# Patient Record
Sex: FEMALE | Race: WHITE
[De-identification: names, ages, dates, MRNs, and addresses within clinical notes are randomized per-mention and may not be internally consistent; named-entity substitution may affect disease eponyms.]

---

## 2019-07-04 ENCOUNTER — HOSPITAL ENCOUNTER (EMERGENCY)
Dept: HOSPITAL 25 - UCEAST | Age: 54
Discharge: HOME | End: 2019-07-04
Payer: COMMERCIAL

## 2019-07-04 DIAGNOSIS — Z87.440: ICD-10-CM

## 2019-07-04 DIAGNOSIS — N39.0: Primary | ICD-10-CM

## 2019-07-04 PROCEDURE — 87086 URINE CULTURE/COLONY COUNT: CPT

## 2019-07-04 PROCEDURE — G0463 HOSPITAL OUTPT CLINIC VISIT: HCPCS

## 2019-07-04 PROCEDURE — 99202 OFFICE O/P NEW SF 15 MIN: CPT

## 2019-07-04 PROCEDURE — 87077 CULTURE AEROBIC IDENTIFY: CPT

## 2019-07-04 PROCEDURE — 87186 SC STD MICRODIL/AGAR DIL: CPT

## 2019-07-04 PROCEDURE — 81003 URINALYSIS AUTO W/O SCOPE: CPT

## 2019-07-04 NOTE — UC
Complaint Female HPI





- HPI Summary


HPI Summary: 





Yesterday AM started w/ dysuria and then today noticed blood in urine. Has had 

UTI in the distant past.  denies fever, back pain





- History Of Current Complaint


Chief Complaint: UCGU


Stated Complaint: URINARY ISSUE


Time Seen by Provider: 07/04/19 11:15


Hx Obtained From: Patient


Pregnant?: No


Pain Intensity: 2


Pain Scale Used: 0-10 Numeric


Character: Burning


Aggravating Factor(s): Urination


Alleviating Factor(s): Nothing


Associated Signs And Symptoms: Negative: Fever, Back Pain, Vomiting(# Of 

Episodes =)





- Allergies/Home Medications


Allergies/Adverse Reactions: 


 Allergies











Allergy/AdvReac Type Severity Reaction Status Date / Time


 


No Known Allergies Allergy   Verified 07/04/19 10:55














PMH/Surg Hx/FS Hx/Imm Hx





- Additional Past Medical History


Additional PMH: 





no chronic conditions.


Previously Healthy: Yes





- Surgical History


Surgical History: Yes





- Family History


Known Family History: Positive: None





- Social History


Alcohol Use: Occasionally


Substance Use Type: None


Smoking Status (MU): Never Smoked Tobacco





Review of Systems


All Other Systems Reviewed And Are Negative: Yes


Constitutional: Negative: Fever, Chills


Skin: Negative: Rash


Cardiovascular: Positive: Negative


Gastrointestinal: Negative: Abdominal Pain


Genitourinary: Positive: Dysuria, Hematuria.  Negative: Vaginal/Penile Discharge





Physical Exam


Triage Information Reviewed: Yes


Appearance: Well-Appearing


Vital Signs: 


 Initial Vital Signs











Temp  98.5 F   07/04/19 10:52


 


Pulse  86   07/04/19 10:52


 


Resp  16   07/04/19 10:52


 


BP  100/52   07/04/19 10:52


 


Pulse Ox  100   07/04/19 10:52











Vital Signs Reviewed: Yes


Respiratory Exam: Normal


Cardiovascular Exam: Normal


Abdomen Description: Positive: Nontender, Soft.  Negative: CVA Tenderness (R), 

CVA Tenderness (L)


Neurological: Positive: Alert





 Complaint Female Dx





- Course


Course Of Treatment: 


  Acute Dysuria , hematuria w/ no assoc. fever.  UA did show +leuks, blood.  

declined gc/chlam testing.  Exam unremarkable.  vitals stable.  will tx 

empirically and send for cx. 





- Differential Dx/Diagnosis


Differential Diagnosis/HQI/PQRI: Urinary Tract Infection


Provider Diagnosis: 


 UTI (urinary tract infection)








Discharge





- Sign-Out/Discharge


Documenting (check all that apply): Patient Departure


All imaging exams completed and their final reports reviewed: No Studies





- Discharge Plan


Condition: Good


Disposition: HOME


Prescriptions: 


Nitrofurantoin Monohyd/M-Cryst [Macrobid 100 mg Capsule] 100 mg PO BID 5 Days #

10 cap


Patient Education Materials:  Urinary Tract Infection in Women (ED)


Referrals: 


[facility] [Outside]


Additional Instructions: 


If not improving please follow up with primary care.





- Billing Disposition and Condition


Condition: GOOD


Disposition: Home